# Patient Record
Sex: FEMALE | Race: WHITE | NOT HISPANIC OR LATINO | URBAN - METROPOLITAN AREA
[De-identification: names, ages, dates, MRNs, and addresses within clinical notes are randomized per-mention and may not be internally consistent; named-entity substitution may affect disease eponyms.]

---

## 2018-03-26 ENCOUNTER — EMERGENCY (EMERGENCY)
Facility: HOSPITAL | Age: 33
LOS: 1 days | Discharge: HOME | End: 2018-03-26
Attending: EMERGENCY MEDICINE | Admitting: EMERGENCY MEDICINE
Payer: COMMERCIAL

## 2018-03-26 VITALS
RESPIRATION RATE: 16 BRPM | OXYGEN SATURATION: 99 % | TEMPERATURE: 98 F | HEART RATE: 83 BPM | DIASTOLIC BLOOD PRESSURE: 68 MMHG | WEIGHT: 160.06 LBS | SYSTOLIC BLOOD PRESSURE: 138 MMHG | HEIGHT: 65 IN

## 2018-03-26 LAB
APPEARANCE UR: CLEAR — SIGNIFICANT CHANGE UP
BILIRUB UR-MCNC: NEGATIVE — SIGNIFICANT CHANGE UP
COLOR SPEC: YELLOW — SIGNIFICANT CHANGE UP
DIFF PNL FLD: ABNORMAL
GLUCOSE UR QL: NEGATIVE — SIGNIFICANT CHANGE UP
HCG UR QL: NEGATIVE — SIGNIFICANT CHANGE UP
KETONES UR-MCNC: NEGATIVE — SIGNIFICANT CHANGE UP
LEUKOCYTE ESTERASE UR-ACNC: NEGATIVE — SIGNIFICANT CHANGE UP
NITRITE UR-MCNC: NEGATIVE — SIGNIFICANT CHANGE UP
PH UR: 5 — SIGNIFICANT CHANGE UP (ref 5–8)
PROT UR-MCNC: NEGATIVE — SIGNIFICANT CHANGE UP
SP GR SPEC: 1.02 — SIGNIFICANT CHANGE UP (ref 1.01–1.02)
UROBILINOGEN FLD QL: NEGATIVE — SIGNIFICANT CHANGE UP

## 2018-03-26 PROCEDURE — 87086 URINE CULTURE/COLONY COUNT: CPT

## 2018-03-26 PROCEDURE — 99283 EMERGENCY DEPT VISIT LOW MDM: CPT

## 2018-03-26 PROCEDURE — 99284 EMERGENCY DEPT VISIT MOD MDM: CPT

## 2018-03-26 PROCEDURE — 81025 URINE PREGNANCY TEST: CPT

## 2018-03-26 PROCEDURE — 81001 URINALYSIS AUTO W/SCOPE: CPT

## 2018-03-26 RX ORDER — VALACYCLOVIR 500 MG/1
1 TABLET, FILM COATED ORAL
Qty: 20 | Refills: 0 | OUTPATIENT
Start: 2018-03-26 | End: 2018-04-04

## 2018-03-26 RX ORDER — VALACYCLOVIR 500 MG/1
1000 TABLET, FILM COATED ORAL ONCE
Qty: 0 | Refills: 0 | Status: COMPLETED | OUTPATIENT
Start: 2018-03-26 | End: 2018-03-26

## 2018-03-26 RX ADMIN — VALACYCLOVIR 1000 MILLIGRAM(S): 500 TABLET, FILM COATED ORAL at 17:38

## 2018-03-26 NOTE — ED ADULT NURSE NOTE - OBJECTIVE STATEMENT
Pt received in bed alert and oriented and resting in bed with c/o vaginal irritation and discharge. As per Md's orders pt urine obtained and sent to the lab. pt stable and nursing care ongoing and safety maintained.

## 2018-03-26 NOTE — ED PROVIDER NOTE - CARE PLAN
Principal Discharge DX:	Vaginal pain Principal Discharge DX:	Vaginal pain  Secondary Diagnosis:	Herpes

## 2018-03-26 NOTE — ED PROVIDER NOTE - OBJECTIVE STATEMENT
31 yo female with hx hypothyroid c/o dysuria x 1 week, with vaginal itching and discharge. c/o bumps on vagina. denies fever or chills. denies vomiting or diarrhea  patient went to urgent care  x3 days ago and had blood and urine sent, but doesn't have results. Patient states she was put on an antibiotic, but doesn't know the name. Patient states she cant wait to follow up with her obgyn

## 2018-03-26 NOTE — ED PROVIDER NOTE - PROGRESS NOTE DETAILS
vaginal pain with blisters, discussed with patient that symptoms are consistent with possible herpes

## 2018-03-27 LAB
C TRACH RRNA SPEC QL NAA+PROBE: SIGNIFICANT CHANGE UP
CULTURE RESULTS: NO GROWTH — SIGNIFICANT CHANGE UP
N GONORRHOEA RRNA SPEC QL NAA+PROBE: SIGNIFICANT CHANGE UP
SPECIMEN SOURCE: SIGNIFICANT CHANGE UP
SPECIMEN SOURCE: SIGNIFICANT CHANGE UP

## 2018-09-06 NOTE — ED ADULT TRIAGE NOTE - PAIN: PRESENCE, MLM
starting to feel a little uncomfortable at times. OBJECTIVE:   Observation:ambulates with decreased hip ext L and early heel rise  Palpation:     Test used Initial score Current Score   Pain Summary VAS 0-1/10    Functional questionnaire LEFS 18%    ROM DF 12     PF 23    Strength quad      ABD 4     flexion          RESTRICTIONS/PRECAUTIONS: progress CK ex as carisa with functional brace    Exercises/Interventions:     Therapeutic Ex Sets/reps Notes   Supine HS stretch HEP    Supine ITB stretch with strap HEP    Supine HF stretch :30x3    gastroc/soleus belt stretch :30x3    Incline gastroc/soleus stretch :30x4 ea    Fig 4 stretch HEP         SLR extension, glut lift, abd 3x10 2#   clams x20 2#   Ankle 4 way x20 ea red VL   Seated HR/TR x20 ea 4#   Seated BAPS  x25 ea DF/PF, INV/EV, CW/CCW   Towel scrunch Ed for HEP              L stance with fwd/retro step x30 With wedge for EV   RXX with tr plane rot R x20    LXX with wedge for EV knee  to 5:94 U54    B HR x20    SLS L :10x5    Tandem on airex :10x5B         Manual Intervention     Mobs for DF/PF, proximal fibular glides, mid foot mobs, 1st ray PF 8'    MWM DF with step     Prone hip PA L with wedge/HF stretch prone 4'/:30x3                   NMR re-education     NMES                                                  Therapeutic Exercise and NMR EXR  [x] (33607) Provided verbal/tactile cueing for activities related to strengthening, flexibility, endurance, ROM for improvements in LE, proximal hip, and core control with self care, mobility, lifting, ambulation.  [] (07419) Provided verbal/tactile cueing for activities related to improving balance, coordination, kinesthetic sense, posture, motor skill, proprioception  to assist with LE, proximal hip, and core control in self care, mobility, lifting, ambulation and eccentric single leg control.      NMR and Therapeutic Activities:    [x] (87097 or [id number]) Provided verbal/tactile cueing for activities related to jumping etc.     Therapist goals for Patient:   Short Term Goals: To be achieved in: 2 weeks  1. Independent in HEP and progression per patient tolerance, in order to prevent re-injury. 2. Patient will have a decrease in pain to facilitate improvement in movement, function, and ADLs as indicated by Functional Deficits. Long Term Goals: To be achieved in: 8 weeks  1. Disability index score of 9% or less for the LEFS to assist with reaching prior level of function. 2. Patient will demonstrate increased AROM to DF 15, PF 40, inv 30, Ev 20 to allow for proper joint functioning as indicated by patients Functional Deficits. 3. Patient will demonstrate an increase in Strength to good proximal hip strength and control >/=4+/5 allow for proper functional mobility as indicated by patients Functional Deficits. 4. Patient will return to walking, stair climbing and CKC functional activities without increased symptoms or restriction. 5. Pt. Would like to return to previous exercise routine without limitation or re-injury(patient specific functional goal)       New or Updated Goals (if applicable):  [x] No change to goals established upon initial eval/last progress note:  New Goals:    Progression Towards Functional goals:   [] Patient is progressing as expected towards functional goals listed. [x] Progression is slowed due to complexities listed. [] Progression has been slowed due to co-morbidities. [] Plan just implemented, too soon to assess goals progression  [] Other:     ASSESSMENT:    [] Improvement noted relative to goals:  [] No Improvement noted related to goals:  Summary/Patient's response to treatment: improved gait following mid foot mobilization with manuals and with standing ex with wedge. Fatigue noted with treatment, no pain. Still limited with balance, strength and proprioception.   Treatment/Activity Tolerance:  [x] Patient tolerated treatment well [] Patient limited by hiram  [] Patient limited by pain  [] Patient limited by other medical complications  [] Other:     Prognosis: [x] Good [] Fair  [] Poor    Patient Requires Follow-up: [x] Yes  [] No    PLAN: See eval  [x] Continue per plan of care [] Alter current plan (see comments)  [] Plan of care initiated [] Hold pending MD visit [] Discharge    Electronically signed by: Edward Valles, [address] [employer]  complains of pain/discomfort

## 2022-02-08 NOTE — ED PROVIDER NOTE - GENITOURINARY UTERUS EXAM
Patient notified that [facility] does have her on the wait list as a replacement machine.  Patient was offer an IBreeze today but patient refused.  Patient was given the number for Valeria at [facility] to call them directly.  No further questions at this time    Electronically Signed by:    Tita Bowman CMA , 2/8/2022       normal